# Patient Record
Sex: FEMALE | Race: OTHER | NOT HISPANIC OR LATINO
[De-identification: names, ages, dates, MRNs, and addresses within clinical notes are randomized per-mention and may not be internally consistent; named-entity substitution may affect disease eponyms.]

---

## 2017-08-02 ENCOUNTER — APPOINTMENT (OUTPATIENT)
Dept: ANTEPARTUM | Facility: CLINIC | Age: 32
End: 2017-08-02

## 2017-08-02 PROBLEM — Z00.00 ENCOUNTER FOR PREVENTIVE HEALTH EXAMINATION: Status: ACTIVE | Noted: 2017-08-02

## 2017-11-06 ENCOUNTER — ASOB RESULT (OUTPATIENT)
Age: 32
End: 2017-11-06

## 2017-11-06 ENCOUNTER — APPOINTMENT (OUTPATIENT)
Dept: ANTEPARTUM | Facility: CLINIC | Age: 32
End: 2017-11-06
Payer: MEDICAID

## 2017-11-06 PROCEDURE — 76805 OB US >/= 14 WKS SNGL FETUS: CPT

## 2017-11-16 ENCOUNTER — TRANSCRIPTION ENCOUNTER (OUTPATIENT)
Age: 32
End: 2017-11-16

## 2017-12-15 ENCOUNTER — INPATIENT (INPATIENT)
Facility: HOSPITAL | Age: 32
LOS: 2 days | Discharge: HOME | End: 2017-12-18
Attending: OBSTETRICS & GYNECOLOGY | Admitting: OBSTETRICS & GYNECOLOGY
Payer: MEDICAID

## 2017-12-15 VITALS — WEIGHT: 174.17 LBS | HEIGHT: 62.5 IN

## 2017-12-15 DIAGNOSIS — O26.893 OTHER SPECIFIED PREGNANCY RELATED CONDITIONS, THIRD TRIMESTER: ICD-10-CM

## 2017-12-15 DIAGNOSIS — O47.1 FALSE LABOR AT OR AFTER 37 COMPLETED WEEKS OF GESTATION: ICD-10-CM

## 2017-12-15 LAB
ABO RH CONFIRMATION: SIGNIFICANT CHANGE UP
APPEARANCE UR: CLEAR — SIGNIFICANT CHANGE UP
BACTERIA # UR AUTO: ABNORMAL
BASOPHILS # BLD AUTO: 0 K/UL — SIGNIFICANT CHANGE UP (ref 0–0.2)
BASOPHILS NFR BLD AUTO: 0.2 % — SIGNIFICANT CHANGE UP (ref 0–2)
BILIRUB UR-MCNC: NEGATIVE — SIGNIFICANT CHANGE UP
BLD GP AB SCN SERPL QL: SIGNIFICANT CHANGE UP
COLOR SPEC: YELLOW — SIGNIFICANT CHANGE UP
DIFF PNL FLD: ABNORMAL
EOSINOPHIL # BLD AUTO: 0.1 K/UL — SIGNIFICANT CHANGE UP (ref 0–0.5)
EOSINOPHIL NFR BLD AUTO: 1.2 % — SIGNIFICANT CHANGE UP (ref 0–6)
EPI CELLS # UR: SIGNIFICANT CHANGE UP
GLUCOSE UR QL: NEGATIVE MG/DL — SIGNIFICANT CHANGE UP
HCT VFR BLD CALC: 35.6 % — LOW (ref 37–47)
HGB BLD-MCNC: 11.5 G/DL — LOW (ref 12–16)
KETONES UR-MCNC: ABNORMAL
LEUKOCYTE ESTERASE UR-ACNC: ABNORMAL
LYMPHOCYTES # BLD AUTO: 1.8 K/UL — SIGNIFICANT CHANGE UP (ref 1–4.8)
LYMPHOCYTES # BLD AUTO: 19.3 % — LOW (ref 20–55)
MCHC RBC-ENTMCNC: 28 PG — SIGNIFICANT CHANGE UP (ref 27–31)
MCHC RBC-ENTMCNC: 32.3 G/DL — SIGNIFICANT CHANGE UP (ref 32–36)
MCV RBC AUTO: 86.6 FL — SIGNIFICANT CHANGE UP (ref 81–99)
MONOCYTES # BLD AUTO: 0.6 K/UL — SIGNIFICANT CHANGE UP (ref 0–0.8)
MONOCYTES NFR BLD AUTO: 6 % — SIGNIFICANT CHANGE UP (ref 3–10)
NEUTROPHILS # BLD AUTO: 6.7 K/UL — SIGNIFICANT CHANGE UP (ref 1.8–8)
NEUTROPHILS NFR BLD AUTO: 72.1 % — SIGNIFICANT CHANGE UP (ref 37–73)
NITRITE UR-MCNC: NEGATIVE — SIGNIFICANT CHANGE UP
PH UR: 6 — SIGNIFICANT CHANGE UP (ref 5–8)
PLATELET # BLD AUTO: 228 K/UL — SIGNIFICANT CHANGE UP (ref 150–400)
PROT UR-MCNC: 15 MG/DL
RBC # BLD: 4.11 M/UL — LOW (ref 4.4–5.2)
RBC # FLD: 18.5 % — HIGH (ref 11–15.6)
RBC CASTS # UR COMP ASSIST: SIGNIFICANT CHANGE UP /HPF (ref 0–4)
SP GR SPEC: 1.02 — SIGNIFICANT CHANGE UP (ref 1.01–1.02)
TYPE + AB SCN PNL BLD: SIGNIFICANT CHANGE UP
UROBILINOGEN FLD QL: NEGATIVE MG/DL — SIGNIFICANT CHANGE UP
WBC # BLD: 9.3 K/UL — SIGNIFICANT CHANGE UP (ref 4.8–10.8)
WBC # FLD AUTO: 9.3 K/UL — SIGNIFICANT CHANGE UP (ref 4.8–10.8)
WBC UR QL: SIGNIFICANT CHANGE UP

## 2017-12-15 RX ORDER — PENICILLIN G POTASSIUM 5000000 [IU]/1
2.5 POWDER, FOR SOLUTION INTRAMUSCULAR; INTRAPLEURAL; INTRATHECAL; INTRAVENOUS EVERY 4 HOURS
Qty: 0 | Refills: 0 | Status: DISCONTINUED | OUTPATIENT
Start: 2017-12-15 | End: 2017-12-18

## 2017-12-15 RX ORDER — CITRIC ACID/SODIUM CITRATE 300-500 MG
30 SOLUTION, ORAL ORAL ONCE
Qty: 0 | Refills: 0 | Status: DISCONTINUED | OUTPATIENT
Start: 2017-12-15 | End: 2017-12-15

## 2017-12-15 RX ORDER — PENICILLIN G POTASSIUM 5000000 [IU]/1
5 POWDER, FOR SOLUTION INTRAMUSCULAR; INTRAPLEURAL; INTRATHECAL; INTRAVENOUS ONCE
Qty: 0 | Refills: 0 | Status: DISCONTINUED | OUTPATIENT
Start: 2017-12-15 | End: 2017-12-15

## 2017-12-15 RX ORDER — PENICILLIN G POTASSIUM 5000000 [IU]/1
5 POWDER, FOR SOLUTION INTRAMUSCULAR; INTRAPLEURAL; INTRATHECAL; INTRAVENOUS ONCE
Qty: 0 | Refills: 0 | Status: COMPLETED | OUTPATIENT
Start: 2017-12-15 | End: 2017-12-15

## 2017-12-15 RX ORDER — PENICILLIN G POTASSIUM 5000000 [IU]/1
POWDER, FOR SOLUTION INTRAMUSCULAR; INTRAPLEURAL; INTRATHECAL; INTRAVENOUS
Qty: 0 | Refills: 0 | Status: DISCONTINUED | OUTPATIENT
Start: 2017-12-15 | End: 2017-12-18

## 2017-12-15 RX ORDER — OXYTOCIN 10 UNIT/ML
41.67 VIAL (ML) INJECTION
Qty: 20 | Refills: 0 | Status: DISCONTINUED | OUTPATIENT
Start: 2017-12-15 | End: 2017-12-16

## 2017-12-15 RX ORDER — SODIUM CHLORIDE 9 MG/ML
1000 INJECTION, SOLUTION INTRAVENOUS ONCE
Qty: 0 | Refills: 0 | Status: COMPLETED | OUTPATIENT
Start: 2017-12-15 | End: 2017-12-16

## 2017-12-15 RX ORDER — CITRIC ACID/SODIUM CITRATE 300-500 MG
30 SOLUTION, ORAL ORAL ONCE
Qty: 0 | Refills: 0 | Status: COMPLETED | OUTPATIENT
Start: 2017-12-15 | End: 2017-12-16

## 2017-12-15 RX ORDER — SODIUM CHLORIDE 9 MG/ML
1000 INJECTION, SOLUTION INTRAVENOUS
Qty: 0 | Refills: 0 | Status: DISCONTINUED | OUTPATIENT
Start: 2017-12-15 | End: 2017-12-16

## 2017-12-15 RX ORDER — PENICILLIN G POTASSIUM 5000000 [IU]/1
2.5 POWDER, FOR SOLUTION INTRAMUSCULAR; INTRAPLEURAL; INTRATHECAL; INTRAVENOUS EVERY 4 HOURS
Qty: 0 | Refills: 0 | Status: DISCONTINUED | OUTPATIENT
Start: 2017-12-15 | End: 2017-12-15

## 2017-12-15 RX ORDER — PENICILLIN G POTASSIUM 5000000 [IU]/1
POWDER, FOR SOLUTION INTRAMUSCULAR; INTRAPLEURAL; INTRATHECAL; INTRAVENOUS
Qty: 0 | Refills: 0 | Status: DISCONTINUED | OUTPATIENT
Start: 2017-12-15 | End: 2017-12-15

## 2017-12-15 RX ADMIN — PENICILLIN G POTASSIUM 200 MILLION UNIT(S): 5000000 POWDER, FOR SOLUTION INTRAMUSCULAR; INTRAPLEURAL; INTRATHECAL; INTRAVENOUS at 20:22

## 2017-12-15 RX ADMIN — PENICILLIN G POTASSIUM 200 MILLION UNIT(S): 5000000 POWDER, FOR SOLUTION INTRAMUSCULAR; INTRAPLEURAL; INTRATHECAL; INTRAVENOUS at 12:36

## 2017-12-15 RX ADMIN — SODIUM CHLORIDE 125 MILLILITER(S): 9 INJECTION, SOLUTION INTRAVENOUS at 11:30

## 2017-12-15 RX ADMIN — PENICILLIN G POTASSIUM 200 MILLION UNIT(S): 5000000 POWDER, FOR SOLUTION INTRAMUSCULAR; INTRAPLEURAL; INTRATHECAL; INTRAVENOUS at 16:39

## 2017-12-16 ENCOUNTER — RESULT REVIEW (OUTPATIENT)
Age: 32
End: 2017-12-16

## 2017-12-16 ENCOUNTER — TRANSCRIPTION ENCOUNTER (OUTPATIENT)
Age: 32
End: 2017-12-16

## 2017-12-16 LAB
BASE EXCESS BLDCOA CALC-SCNC: -5.7 MMOL/L — LOW (ref -2–2)
BASE EXCESS BLDCOV CALC-SCNC: -6.8 MMOL/L — LOW (ref -2–2)
GAS PNL BLDCOV: 7.24 — LOW (ref 7.25–7.45)
HCO3 BLDCOA-SCNC: 18 MMOL/L — LOW (ref 21–29)
HCO3 BLDCOV-SCNC: 18 MMOL/L — LOW (ref 21–29)
PCO2 BLDCOA: 58.8 MMHG — SIGNIFICANT CHANGE UP (ref 32–68)
PCO2 BLDCOV: 49.6 MMHG — SIGNIFICANT CHANGE UP (ref 29–53)
PH BLDCOA: 7.21 — SIGNIFICANT CHANGE UP (ref 7.18–7.38)
PO2 BLDCOA: 11.6 MMHG — SIGNIFICANT CHANGE UP (ref 5.7–30.5)
PO2 BLDCOA: 26 MMHG — SIGNIFICANT CHANGE UP (ref 17–41)
SAO2 % BLDCOA: SIGNIFICANT CHANGE UP
SAO2 % BLDCOV: SIGNIFICANT CHANGE UP

## 2017-12-16 PROCEDURE — 88307 TISSUE EXAM BY PATHOLOGIST: CPT | Mod: 26

## 2017-12-16 RX ORDER — ACETAMINOPHEN 500 MG
650 TABLET ORAL EVERY 6 HOURS
Qty: 0 | Refills: 0 | Status: DISCONTINUED | OUTPATIENT
Start: 2017-12-16 | End: 2017-12-18

## 2017-12-16 RX ORDER — LANOLIN
1 OINTMENT (GRAM) TOPICAL EVERY 6 HOURS
Qty: 0 | Refills: 0 | Status: DISCONTINUED | OUTPATIENT
Start: 2017-12-16 | End: 2017-12-18

## 2017-12-16 RX ORDER — GLYCERIN ADULT
1 SUPPOSITORY, RECTAL RECTAL AT BEDTIME
Qty: 0 | Refills: 0 | Status: DISCONTINUED | OUTPATIENT
Start: 2017-12-16 | End: 2017-12-18

## 2017-12-16 RX ORDER — DIPHENHYDRAMINE HCL 50 MG
25 CAPSULE ORAL EVERY 6 HOURS
Qty: 0 | Refills: 0 | Status: DISCONTINUED | OUTPATIENT
Start: 2017-12-16 | End: 2017-12-18

## 2017-12-16 RX ORDER — SIMETHICONE 80 MG/1
80 TABLET, CHEWABLE ORAL EVERY 6 HOURS
Qty: 0 | Refills: 0 | Status: DISCONTINUED | OUTPATIENT
Start: 2017-12-16 | End: 2017-12-18

## 2017-12-16 RX ORDER — AER TRAVELER 0.5 G/1
1 SOLUTION RECTAL; TOPICAL EVERY 4 HOURS
Qty: 0 | Refills: 0 | Status: DISCONTINUED | OUTPATIENT
Start: 2017-12-16 | End: 2017-12-18

## 2017-12-16 RX ORDER — SODIUM CHLORIDE 9 MG/ML
3 INJECTION INTRAMUSCULAR; INTRAVENOUS; SUBCUTANEOUS EVERY 8 HOURS
Qty: 0 | Refills: 0 | Status: DISCONTINUED | OUTPATIENT
Start: 2017-12-16 | End: 2017-12-18

## 2017-12-16 RX ORDER — DIBUCAINE 1 %
1 OINTMENT (GRAM) RECTAL EVERY 4 HOURS
Qty: 0 | Refills: 0 | Status: DISCONTINUED | OUTPATIENT
Start: 2017-12-16 | End: 2017-12-18

## 2017-12-16 RX ORDER — IBUPROFEN 200 MG
600 TABLET ORAL EVERY 6 HOURS
Qty: 0 | Refills: 0 | Status: DISCONTINUED | OUTPATIENT
Start: 2017-12-16 | End: 2017-12-18

## 2017-12-16 RX ORDER — PRAMOXINE HYDROCHLORIDE 150 MG/15G
1 AEROSOL, FOAM RECTAL EVERY 4 HOURS
Qty: 0 | Refills: 0 | Status: DISCONTINUED | OUTPATIENT
Start: 2017-12-16 | End: 2017-12-18

## 2017-12-16 RX ORDER — TETANUS TOXOID, REDUCED DIPHTHERIA TOXOID AND ACELLULAR PERTUSSIS VACCINE, ADSORBED 5; 2.5; 8; 8; 2.5 [IU]/.5ML; [IU]/.5ML; UG/.5ML; UG/.5ML; UG/.5ML
0.5 SUSPENSION INTRAMUSCULAR ONCE
Qty: 0 | Refills: 0 | Status: DISCONTINUED | OUTPATIENT
Start: 2017-12-16 | End: 2017-12-18

## 2017-12-16 RX ORDER — DOCUSATE SODIUM 100 MG
100 CAPSULE ORAL
Qty: 0 | Refills: 0 | Status: DISCONTINUED | OUTPATIENT
Start: 2017-12-16 | End: 2017-12-18

## 2017-12-16 RX ORDER — SODIUM CHLORIDE 9 MG/ML
500 INJECTION, SOLUTION INTRAVENOUS
Qty: 0 | Refills: 0 | Status: COMPLETED | OUTPATIENT
Start: 2017-12-16 | End: 2017-12-16

## 2017-12-16 RX ORDER — OXYCODONE AND ACETAMINOPHEN 5; 325 MG/1; MG/1
2 TABLET ORAL EVERY 6 HOURS
Qty: 0 | Refills: 0 | Status: DISCONTINUED | OUTPATIENT
Start: 2017-12-16 | End: 2017-12-18

## 2017-12-16 RX ORDER — MAGNESIUM HYDROXIDE 400 MG/1
30 TABLET, CHEWABLE ORAL
Qty: 0 | Refills: 0 | Status: DISCONTINUED | OUTPATIENT
Start: 2017-12-16 | End: 2017-12-18

## 2017-12-16 RX ORDER — HYDROCORTISONE 1 %
1 OINTMENT (GRAM) TOPICAL EVERY 4 HOURS
Qty: 0 | Refills: 0 | Status: DISCONTINUED | OUTPATIENT
Start: 2017-12-16 | End: 2017-12-18

## 2017-12-16 RX ORDER — OXYTOCIN 10 UNIT/ML
41.67 VIAL (ML) INJECTION
Qty: 20 | Refills: 0 | Status: DISCONTINUED | OUTPATIENT
Start: 2017-12-16 | End: 2017-12-18

## 2017-12-16 RX ADMIN — PENICILLIN G POTASSIUM 200 MILLION UNIT(S): 5000000 POWDER, FOR SOLUTION INTRAMUSCULAR; INTRAPLEURAL; INTRATHECAL; INTRAVENOUS at 04:06

## 2017-12-16 RX ADMIN — Medication 600 MILLIGRAM(S): at 21:30

## 2017-12-16 RX ADMIN — OXYCODONE AND ACETAMINOPHEN 2 TABLET(S): 5; 325 TABLET ORAL at 19:10

## 2017-12-16 RX ADMIN — Medication 600 MILLIGRAM(S): at 13:56

## 2017-12-16 RX ADMIN — OXYCODONE AND ACETAMINOPHEN 2 TABLET(S): 5; 325 TABLET ORAL at 13:00

## 2017-12-16 RX ADMIN — SODIUM CHLORIDE 3 MILLILITER(S): 9 INJECTION INTRAMUSCULAR; INTRAVENOUS; SUBCUTANEOUS at 16:39

## 2017-12-16 RX ADMIN — Medication 30 MILLILITER(S): at 03:06

## 2017-12-16 RX ADMIN — Medication 1 TABLET(S): at 13:56

## 2017-12-16 RX ADMIN — OXYCODONE AND ACETAMINOPHEN 2 TABLET(S): 5; 325 TABLET ORAL at 17:27

## 2017-12-16 RX ADMIN — SODIUM CHLORIDE 999 MILLILITER(S): 9 INJECTION, SOLUTION INTRAVENOUS at 04:54

## 2017-12-16 RX ADMIN — Medication 600 MILLIGRAM(S): at 14:50

## 2017-12-16 RX ADMIN — Medication 600 MILLIGRAM(S): at 20:39

## 2017-12-16 RX ADMIN — SODIUM CHLORIDE 2000 MILLILITER(S): 9 INJECTION, SOLUTION INTRAVENOUS at 02:35

## 2017-12-16 RX ADMIN — PENICILLIN G POTASSIUM 200 MILLION UNIT(S): 5000000 POWDER, FOR SOLUTION INTRAMUSCULAR; INTRAPLEURAL; INTRATHECAL; INTRAVENOUS at 08:35

## 2017-12-16 RX ADMIN — OXYCODONE AND ACETAMINOPHEN 2 TABLET(S): 5; 325 TABLET ORAL at 11:34

## 2017-12-16 RX ADMIN — PENICILLIN G POTASSIUM 200 MILLION UNIT(S): 5000000 POWDER, FOR SOLUTION INTRAMUSCULAR; INTRAPLEURAL; INTRATHECAL; INTRAVENOUS at 00:18

## 2017-12-16 NOTE — DISCHARGE NOTE OB - MATERIALS PROVIDED
Breastfeeding Log/MMR Vaccination (VIS Pub Date: 2012)/Vaccinations/Breastfeeding Mother’s Support Group Information/Breastfeeding Guide and Packet/Back To Sleep Handout/Shaken Baby Prevention Handout/Birth Certificate Instructions/Tdap Vaccination (VIS Pub Date: 2012)/[facility]  Screening Program/  Immunization Record/Guide to Postpartum Care/[facility] Hearing Screen Program/Discharge Medication Information for Patients and Families Pocket Guide/Letter of Medical Neccessity

## 2017-12-16 NOTE — DISCHARGE NOTE OB - HOSPITAL COURSE
32 years old female  who underwent vacuum-assisted delivery due to tracing category II and maternal exhaustion after induction of labor for premature rupture of membranes.   She delivered a live male infant weighting  4380g and with Apgar scores 3/8, posterior shoulder failed to deliver initially due to poor maternal efforts but it was delivered with rotational maneuver. No episiotomy needed, no lacerations present but she received prophylactically 1000 mcg of misoprostol rectally due to mild uterine atony. EBL was 300mL.   On her postpartum, patient complained of severe pubic, vaginal and lower back that interfered with ambulation, for which CT scan of abdomen and pelvis was ordered that failed to show any abnormalities other than normal postpartum findings. Patient was evaluated by PT and she is able to ambulate with rolling walker, she is voiding, tolerating PO intake and having bowel movements. Her Hb/Hct are 9.0g/dL and 28.5% respectively, she is stable and ready for discharge home. F/U at [facility] in 6 weeks for postpartum check and with PT as outpatient if necessary.

## 2017-12-16 NOTE — DISCHARGE NOTE OB - OTHER SIGNIFICANT FINDINGS
< from: CT Abdomen and Pelvis No Cont (12.17.17 @ 17:07) >  FINDINGS:    LOWER CHEST: Visualized lung bases, heart and pericardium are   unremarkable.    LIVER: Within normal limits.  SPLEEN: Within normal limits.  PANCREAS: Within normal limits.  GALLBLADDER: Within normal limits.  BILE DUCTS: Normal caliber.  ADRENALS: Within normal limits.  KIDNEYS/URETERS: No mass, stone or hydronephrosis.    RETROPERITONEUM: No lymphadenopathy.    VESSELS:  Within normal limits.    BOWEL: No bowel obstruction, wall thickening or inflammatory change.   Appendix normal.  PERITONEUM: No ascites , hemorrhage or pneumoperitoneum. There is no   intra-abdominal fluid collection.    REPRODUCTIVE ORGANS: Enlarged uterus compatible with postpartum state. A   small amount of clot is noted in the endometrial and endocervical   cavity's.  BLADDER: Within normal limits.    ABDOMINAL WALL: Within normal limits.  BONES: No acute bony abnormality.    IMPRESSION:   Expected changes in the uterus status post recent vaginal delivery with   small amount of clot in the endometrial and endocervical cavities. No   other acute findings      < end of copied text >

## 2017-12-16 NOTE — DISCHARGE NOTE OB - PLAN OF CARE
Follow the recommendations by physical therapy, walk with supervision and use the rolling walker for support and outpatient follow up if necessary.  Continue taking Colace 100 mg 2 times per day as needed to soften stools and eat plenty of fruits and vegetables and drink a lot of fluids to prevent straining during defecation.  Continue applying Dibucaine ointment and Pramoxine pain cream every 4 hours for perineal pain.   Continue taking Percocet 2 tablets every 4 hours, only as needed for severe pain and Ibuprofen 1 tablet every 6 hours, only as needed for moderate pain. Reached Please f/u at [facility] in 6 weeks for postpartum check, take prenatal vitamins and iron as prescribed. Continue Breastfeeding and mother/baby bonding.

## 2017-12-16 NOTE — DISCHARGE NOTE OB - CARE PLAN
Principal Discharge DX:	Forceps or vacuum extractor delivery  Goal:	Reached  Instructions for follow-up, activity and diet:	Please f/u at [facility] in 6 weeks for postpartum check, take prenatal vitamins and iron as prescribed. Continue Breastfeeding and mother/baby bonding.  Secondary Diagnosis:	Perineal pain in female  Instructions for follow-up, activity and diet:	Follow the recommendations by physical therapy, walk with supervision and use the rolling walker for support and outpatient follow up if necessary.  Continue taking Colace 100 mg 2 times per day as needed to soften stools and eat plenty of fruits and vegetables and drink a lot of fluids to prevent straining during defecation.  Continue applying Dibucaine ointment and Pramoxine pain cream every 4 hours for perineal pain.   Continue taking Percocet 2 tablets every 4 hours, only as needed for severe pain and Ibuprofen 1 tablet every 6 hours, only as needed for moderate pain. Principal Discharge DX:	Forceps or vacuum extractor delivery  Goal:	Reached  Instructions for follow-up, activity and diet:	Please f/u at [facility] in 6 weeks for postpartum check, take prenatal vitamins and iron as prescribed. Continue Breastfeeding and mother/baby bonding.  Secondary Diagnosis:	Postpartum pain  Instructions for follow-up, activity and diet:	Follow the recommendations by physical therapy, walk with supervision and use the rolling walker for support and outpatient follow up if necessary.  Continue taking Colace 100 mg 2 times per day as needed to soften stools and eat plenty of fruits and vegetables and drink a lot of fluids to prevent straining during defecation.  Continue applying Dibucaine ointment and Pramoxine pain cream every 4 hours for perineal pain.   Continue taking Percocet 2 tablets every 4 hours, only as needed for severe pain and Ibuprofen 1 tablet every 6 hours, only as needed for moderate pain.

## 2017-12-16 NOTE — DISCHARGE NOTE OB - PATIENT PORTAL LINK FT
“You can access the Follow[facility] Patient Portal, offered by [facility], by registering with the following website: [email or web address]”

## 2017-12-16 NOTE — DISCHARGE NOTE OB - CARE PROVIDER_API CALL
Cher Grove (MD), Sascha [facility] Obstetrics and Gynecology  [facility] [place] [address]  Phone: [phone]  Fax: [phone]

## 2017-12-16 NOTE — DISCHARGE NOTE OB - MEDICATION SUMMARY - MEDICATIONS TO TAKE
I will START or STAY ON the medications listed below when I get home from the hospital:    ibuprofen 600 mg oral tablet  -- 1 tab(s) by mouth every 6 hours, As needed, Moderate Pain  -- Indication: For Moderate pain    oxyCODONE-acetaminophen 5 mg-325 mg oral tablet  -- 2 tab(s) by mouth every 6 hours, As needed, Severe Pain MDD:8 tablets  -- Indication: For Severe Pain    pramoxine-zinc oxide 1%-5% topical cream  -- Apply on skin to affected area every 4 hours, As Needed for Moderate to severe perineal pain  -- For external use only.    -- Indication: For Perineal pain    dibucaine 1% topical ointment  -- 1 application on skin every 4 hours, As needed, Perineal Discomfort  -- Indication: For Perineal pain    Prenatal Multivitamins with Folic Acid 1 mg oral tablet  -- 1 tab(s) by mouth once a day  -- Indication: For Supplement    ferrous sulfate 325 mg (65 mg elemental iron) oral tablet  -- 1 tab(s) by mouth once a day   -- Check with your doctor before becoming pregnant.  Do not chew, break, or crush.  May discolor urine or feces.    -- Indication: For Supplement    Colace 100 mg oral capsule  -- 1 cap(s) by mouth 2 times a day, As needed, Stool Softening  -- Indication: For Constipation I will START or STAY ON the medications listed below when I get home from the hospital:    DME rolling walker  -- DME Rolling walker    ICD 10: Z37.0   wtih vacuum assisted  -- Indication: For severe pain    oxyCODONE-acetaminophen 5 mg-325 mg oral tablet  -- 2 tab(s) by mouth every 6 hours, As needed, Severe Pain MDD:8 tablets  -- Indication: For Severe Pain    ibuprofen 600 mg oral tablet  -- 1 tab(s) by mouth every 6 hours, As needed, Moderate Pain  -- Indication: For Moderate pain    pramoxine-zinc oxide 1%-5% topical cream  -- Apply on skin to affected area every 4 hours, As Needed for Moderate to severe perineal pain  -- For external use only.    -- Indication: For Perineal pain    dibucaine 1% topical ointment  -- 1 application on skin every 4 hours, As needed, Perineal Discomfort  -- Indication: For Perineal pain    Prenatal Multivitamins with Folic Acid 1 mg oral tablet  -- 1 tab(s) by mouth once a day  -- Indication: For Supplement    ferrous sulfate 325 mg (65 mg elemental iron) oral tablet  -- 1 tab(s) by mouth once a day   -- Check with your doctor before becoming pregnant.  Do not chew, break, or crush.  May discolor urine or feces.    -- Indication: For Supplement    Colace 100 mg oral capsule  -- 1 cap(s) by mouth 2 times a day, As needed, Stool Softening  -- Indication: For Constipation

## 2017-12-17 DIAGNOSIS — R10.9 UNSPECIFIED ABDOMINAL PAIN: ICD-10-CM

## 2017-12-17 LAB
BASOPHILS # BLD AUTO: 0 K/UL — SIGNIFICANT CHANGE UP (ref 0–0.2)
BASOPHILS NFR BLD AUTO: 0.1 % — SIGNIFICANT CHANGE UP (ref 0–2)
EOSINOPHIL # BLD AUTO: 0.1 K/UL — SIGNIFICANT CHANGE UP (ref 0–0.5)
EOSINOPHIL NFR BLD AUTO: 0.9 % — SIGNIFICANT CHANGE UP (ref 0–6)
HCT VFR BLD CALC: 28.5 % — LOW (ref 37–47)
HGB BLD-MCNC: 9 G/DL — LOW (ref 12–16)
LYMPHOCYTES # BLD AUTO: 18.1 % — LOW (ref 20–55)
LYMPHOCYTES # BLD AUTO: 2.5 K/UL — SIGNIFICANT CHANGE UP (ref 1–4.8)
MCHC RBC-ENTMCNC: 27.8 PG — SIGNIFICANT CHANGE UP (ref 27–31)
MCHC RBC-ENTMCNC: 31.6 G/DL — LOW (ref 32–36)
MCV RBC AUTO: 88 FL — SIGNIFICANT CHANGE UP (ref 81–99)
MONOCYTES # BLD AUTO: 0.7 K/UL — SIGNIFICANT CHANGE UP (ref 0–0.8)
MONOCYTES NFR BLD AUTO: 5.3 % — SIGNIFICANT CHANGE UP (ref 3–10)
NEUTROPHILS # BLD AUTO: 10.4 K/UL — HIGH (ref 1.8–8)
NEUTROPHILS NFR BLD AUTO: 75 % — HIGH (ref 37–73)
PLATELET # BLD AUTO: 192 K/UL — SIGNIFICANT CHANGE UP (ref 150–400)
RBC # BLD: 3.24 M/UL — LOW (ref 4.4–5.2)
RBC # FLD: 18.9 % — HIGH (ref 11–15.6)
T PALLIDUM AB TITR SER: NEGATIVE — SIGNIFICANT CHANGE UP
WBC # BLD: 13.9 K/UL — HIGH (ref 4.8–10.8)
WBC # FLD AUTO: 13.9 K/UL — HIGH (ref 4.8–10.8)

## 2017-12-17 PROCEDURE — 74176 CT ABD & PELVIS W/O CONTRAST: CPT | Mod: 26

## 2017-12-17 RX ADMIN — OXYCODONE AND ACETAMINOPHEN 2 TABLET(S): 5; 325 TABLET ORAL at 20:30

## 2017-12-17 RX ADMIN — SODIUM CHLORIDE 3 MILLILITER(S): 9 INJECTION INTRAMUSCULAR; INTRAVENOUS; SUBCUTANEOUS at 17:55

## 2017-12-17 RX ADMIN — Medication 600 MILLIGRAM(S): at 09:25

## 2017-12-17 RX ADMIN — OXYCODONE AND ACETAMINOPHEN 2 TABLET(S): 5; 325 TABLET ORAL at 13:45

## 2017-12-17 RX ADMIN — Medication 600 MILLIGRAM(S): at 22:45

## 2017-12-17 RX ADMIN — OXYCODONE AND ACETAMINOPHEN 2 TABLET(S): 5; 325 TABLET ORAL at 19:53

## 2017-12-17 RX ADMIN — OXYCODONE AND ACETAMINOPHEN 2 TABLET(S): 5; 325 TABLET ORAL at 12:49

## 2017-12-17 RX ADMIN — Medication 600 MILLIGRAM(S): at 23:45

## 2017-12-17 RX ADMIN — Medication 600 MILLIGRAM(S): at 08:26

## 2017-12-17 RX ADMIN — Medication 1 TABLET(S): at 12:48

## 2017-12-17 RX ADMIN — OXYCODONE AND ACETAMINOPHEN 2 TABLET(S): 5; 325 TABLET ORAL at 06:22

## 2017-12-17 RX ADMIN — OXYCODONE AND ACETAMINOPHEN 2 TABLET(S): 5; 325 TABLET ORAL at 07:20

## 2017-12-17 NOTE — PROGRESS NOTE ADULT - ASSESSMENT
32 year old  s/p  with vacuum assist at [id number] gestation PPD#1. Patient is currently progressing towards discharge on PPD2. 32 year old  s/p  with vacuum assist at [id number] gestation PPD#1. Patient is currently progressing towards discharge on PPD2. Will order CT Abd/Pelv to rule out any vaginal or retroperitoneal hematoma. If negative, will continue pain control. PT eval as well due to difficulty for patient to ambulate.

## 2017-12-17 NOTE — PROGRESS NOTE ADULT - SUBJECTIVE AND OBJECTIVE BOX
INTERVAL HPI/OVERNIGHT EVENTS:  32y Female s/p labor epidural on 12/16/17    Vital Signs Last 24 Hrs  T(C): 36.6 (17 Dec 2017 05:14), Max: 37.2 (16 Dec 2017 15:47)  T(F): 97.8 (17 Dec 2017 05:14), Max: 98.9 (16 Dec 2017 15:47)  HR: 91 (17 Dec 2017 05:14) (76 - 106)  BP: 105/72 (17 Dec 2017 05:14) (96/66 - 120/74)  BP(mean): --  RR: 20 (17 Dec 2017 05:14) (15 - 20)  SpO2: --    Patient seen, doing well, no anesthetic complications or complaints noted or reported.

## 2017-12-17 NOTE — PROGRESS NOTE ADULT - SUBJECTIVE AND OBJECTIVE BOX
32 year old  s/p  with vacuum assist at [id number] gestation PPD#1.   Patient seen and examined at bedside, no acute overnight events.   Patient is ambulating, +eating, +PO hydration, +voiding, +Flatus, -BM, +Breast feeding and pain is causing some mild discomfort in her lower abdomen and pelvic area.   Denies headache, SOB, fever, chills and calf pain.    VS:   Vital Signs Last 24 Hrs  T(C): 36.6 (17 Dec 2017 05:14), Max: 37.2 (16 Dec 2017 15:47)  T(F): 97.8 (17 Dec 2017 05:14), Max: 98.9 (16 Dec 2017 15:47)  HR: 91 (17 Dec 2017 05:14) (76 - 106)  BP: 105/72 (17 Dec 2017 05:14) (96/66 - 120/74)  BP(mean): --  RR: 20 (17 Dec 2017 05:14) (15 - 20)  SpO2: --    Physical Exam:  General: NAD  CVS: RRR, +S1/S2  Lungs: CTAB, no wheeze, rhonchi or rales.   Abdomen: +BS, soft, ND, minimally tender, Fundus firm at level of umbilicus.   Pelvic: Minimal lochia  Ext: No cyanosis, edema or calf tenderness. 2+ peripheral pulses    Labs:                        11.5   9.3   )-----------( 228      ( 15 Dec 2017 14:14 )             35.6     Urinalysis Basic - ( 15 Dec 2017 14:16 )    Color: Yellow / Appearance: Clear / S.020 / pH: x  Gluc: x / Ketone: Trace  / Bili: Negative / Urobili: Negative mg/dL   Blood: x / Protein: 15 mg/dL / Nitrite: Negative   Leuk Esterase: Trace / RBC: 0-2 /HPF / WBC 3-5   Sq Epi: x / Non Sq Epi: Occasional / Bacteria: Occasional        Medication:  MEDICATIONS  (STANDING):  diphtheria/tetanus/pertussis (acellular) Vaccine (ADAcel) 0.5 milliLiter(s) IntraMuscular once  oxytocin Infusion 41.667 milliUNIT(s)/Min (125 mL/Hr) IV Continuous <Continuous>  penicillin   G  potassium  IVPB 2.5 Million Unit(s) IV Intermittent every 4 hours  penicillin   G  potassium  IVPB      prenatal multivitamin 1 Tablet(s) Oral daily  sodium chloride 0.9% lock flush 3 milliLiter(s) IV Push every 8 hours    MEDICATIONS  (PRN):  acetaminophen   Tablet 650 milliGRAM(s) Oral every 6 hours PRN For Temp greater than 38.5 C (101.3 F)  acetaminophen   Tablet. 650 milliGRAM(s) Oral every 6 hours PRN Mild Pain  dibucaine 1% Ointment 1 Application(s) Topical every 4 hours PRN Perineal Discomfort  diphenhydrAMINE   Capsule 25 milliGRAM(s) Oral every 6 hours PRN Itching  docusate sodium 100 milliGRAM(s) Oral two times a day PRN Stool Softening  glycerin Suppository - Adult 1 Suppository(s) Rectal at bedtime PRN Constipation  hydrocortisone 1% Cream 1 Application(s) Topical every 4 hours PRN Moderate to Severe Perineal Pain  ibuprofen  Tablet 600 milliGRAM(s) Oral every 6 hours PRN Moderate Pain  lanolin Ointment 1 Application(s) Topical every 6 hours PRN Sore Nipples  magnesium hydroxide Suspension 30 milliLiter(s) Oral two times a day PRN Constipation  oxyCODONE    5 mG/acetaminophen 325 mG 2 Tablet(s) Oral every 6 hours PRN Severe Pain  pramoxine 1%/zinc 5% Cream 1 Application(s) Topical every 4 hours PRN Moderate to Severe Perineal Pain  simethicone 80 milliGRAM(s) Chew every 6 hours PRN Gas  witch hazel Pads 1 Application(s) Topical every 4 hours PRN Perineal Discomfort 32 year old  s/p  with vacuum assist at [id number] gestation PPD#1.   Patient seen and examined at bedside, no acute overnight events.   Patient is ambulating, +eating, +PO hydration, +voiding, +Flatus, -BM, +Breast feeding and pain is causing some mild discomfort in her lower abdomen and pelvic area.   Denies headache, SOB, fever, chills and calf pain.    VS:   Vital Signs Last 24 Hrs  T(C): 36.6 (17 Dec 2017 05:14), Max: 37.2 (16 Dec 2017 15:47)  T(F): 97.8 (17 Dec 2017 05:14), Max: 98.9 (16 Dec 2017 15:47)  HR: 91 (17 Dec 2017 05:14) (76 - 106)  BP: 105/72 (17 Dec 2017 05:14) (96/66 - 120/74)  BP(mean): --  RR: 20 (17 Dec 2017 05:14) (15 - 20)  SpO2: --    Physical Exam:  General: NAD  CVS: RRR, +S1/S2  Lungs: CTAB, no wheeze, rhonchi or rales.   Abdomen: +BS, soft, ND,  Fundus firm at level of umbilicus. Tenderness in LLQ, RLQ and suprapublically.   Pelvic: Minimal lochia  Ext: No cyanosis, edema or calf tenderness. 2+ peripheral pulses    Labs:                        11.5   9.3   )-----------( 228      ( 15 Dec 2017 14:14 )             35.6     Urinalysis Basic - ( 15 Dec 2017 14:16 )    Color: Yellow / Appearance: Clear / S.020 / pH: x  Gluc: x / Ketone: Trace  / Bili: Negative / Urobili: Negative mg/dL   Blood: x / Protein: 15 mg/dL / Nitrite: Negative   Leuk Esterase: Trace / RBC: 0-2 /HPF / WBC 3-5   Sq Epi: x / Non Sq Epi: Occasional / Bacteria: Occasional        Medication:  MEDICATIONS  (STANDING):  diphtheria/tetanus/pertussis (acellular) Vaccine (ADAcel) 0.5 milliLiter(s) IntraMuscular once  oxytocin Infusion 41.667 milliUNIT(s)/Min (125 mL/Hr) IV Continuous <Continuous>  penicillin   G  potassium  IVPB 2.5 Million Unit(s) IV Intermittent every 4 hours  penicillin   G  potassium  IVPB      prenatal multivitamin 1 Tablet(s) Oral daily  sodium chloride 0.9% lock flush 3 milliLiter(s) IV Push every 8 hours    MEDICATIONS  (PRN):  acetaminophen   Tablet 650 milliGRAM(s) Oral every 6 hours PRN For Temp greater than 38.5 C (101.3 F)  acetaminophen   Tablet. 650 milliGRAM(s) Oral every 6 hours PRN Mild Pain  dibucaine 1% Ointment 1 Application(s) Topical every 4 hours PRN Perineal Discomfort  diphenhydrAMINE   Capsule 25 milliGRAM(s) Oral every 6 hours PRN Itching  docusate sodium 100 milliGRAM(s) Oral two times a day PRN Stool Softening  glycerin Suppository - Adult 1 Suppository(s) Rectal at bedtime PRN Constipation  hydrocortisone 1% Cream 1 Application(s) Topical every 4 hours PRN Moderate to Severe Perineal Pain  ibuprofen  Tablet 600 milliGRAM(s) Oral every 6 hours PRN Moderate Pain  lanolin Ointment 1 Application(s) Topical every 6 hours PRN Sore Nipples  magnesium hydroxide Suspension 30 milliLiter(s) Oral two times a day PRN Constipation  oxyCODONE    5 mG/acetaminophen 325 mG 2 Tablet(s) Oral every 6 hours PRN Severe Pain  pramoxine 1%/zinc 5% Cream 1 Application(s) Topical every 4 hours PRN Moderate to Severe Perineal Pain  simethicone 80 milliGRAM(s) Chew every 6 hours PRN Gas  witch hazel Pads 1 Application(s) Topical every 4 hours PRN Perineal Discomfort

## 2017-12-17 NOTE — PROGRESS NOTE ADULT - PROBLEM SELECTOR PLAN 1
Encourage ambulation & hydration  Encourage mother/baby interaction  Plan for discharge PPD2  Pain control PRN

## 2017-12-17 NOTE — PROGRESS NOTE ADULT - ASSESSMENT
A/P 32y on PPD#1 s/p vacuum assisted VD, stable, Patient reports severe pubic, vaginal and lower back pain that has not improved over night.     Ambulating with difficulty due to pain, voiding, tolerating PO, lochia decreased  Vital Signs Last 24 Hrs  T(C): 36.6 (17 Dec 2017 05:14), Max: 37.2 (16 Dec 2017 15:47)  T(F): 97.8 (17 Dec 2017 05:14), Max: 98.9 (16 Dec 2017 15:47)  HR: 91 (17 Dec 2017 05:14) (76 - 106)  BP: 105/72 (17 Dec 2017 05:14) (96/66 - 120/74)  BP(mean): --  RR: 20 (17 Dec 2017 05:14) (15 - 20)  SpO2: --    PHYSICAL EXAM:    CHEST/LUNG: Clear to percussion bilaterally; No rales, rhonchi, wheezing, or rubs  HEART: Regular rate and rhythm; No murmurs, rubs, or gallops  BREASTS: deferred  ABDOMEN: Soft, Nontender, Nondistended; fundus is firm and Bowel sounds present  PERINEUM: Intact  and lochia minimal  SVE: no palpable masses; however exam was suboptimal due to patient discomfort  EXTREMITIES:  2+ Peripheral Pulses, No clubbing, cyanosis, or edema    MEDICATIONS  (STANDING):  diphtheria/tetanus/pertussis (acellular) Vaccine (ADAcel) 0.5 milliLiter(s) IntraMuscular once  oxytocin Infusion 41.667 milliUNIT(s)/Min (125 mL/Hr) IV Continuous <Continuous>  penicillin   G  potassium  IVPB 2.5 Million Unit(s) IV Intermittent every 4 hours  penicillin   G  potassium  IVPB      prenatal multivitamin 1 Tablet(s) Oral daily  sodium chloride 0.9% lock flush 3 milliLiter(s) IV Push every 8 hours    MEDICATIONS  (PRN):  acetaminophen   Tablet 650 milliGRAM(s) Oral every 6 hours PRN For Temp greater than 38.5 C (101.3 F)  acetaminophen   Tablet. 650 milliGRAM(s) Oral every 6 hours PRN Mild Pain  dibucaine 1% Ointment 1 Application(s) Topical every 4 hours PRN Perineal Discomfort  diphenhydrAMINE   Capsule 25 milliGRAM(s) Oral every 6 hours PRN Itching  docusate sodium 100 milliGRAM(s) Oral two times a day PRN Stool Softening  glycerin Suppository - Adult 1 Suppository(s) Rectal at bedtime PRN Constipation  hydrocortisone 1% Cream 1 Application(s) Topical every 4 hours PRN Moderate to Severe Perineal Pain  ibuprofen  Tablet 600 milliGRAM(s) Oral every 6 hours PRN Moderate Pain  lanolin Ointment 1 Application(s) Topical every 6 hours PRN Sore Nipples  magnesium hydroxide Suspension 30 milliLiter(s) Oral two times a day PRN Constipation  oxyCODONE    5 mG/acetaminophen 325 mG 2 Tablet(s) Oral every 6 hours PRN Severe Pain  pramoxine 1%/zinc 5% Cream 1 Application(s) Topical every 4 hours PRN Moderate to Severe Perineal Pain  simethicone 80 milliGRAM(s) Chew every 6 hours PRN Gas  witch hazel Pads 1 Application(s) Topical every 4 hours PRN Perineal Discomfort        LABS:                        11.5   9.3   )-----------( 228      ( 15 Dec 2017 14:14 )             35.6       1. Pain: well controlled on OPM  2. GI: Regular diet  3. : voiding  4. DVT prophylaxis: ambulate  5. Follow AM CBC  6. CT abdomen/pelvis  7.Continue PP care

## 2017-12-17 NOTE — PHYSICAL THERAPY INITIAL EVALUATION ADULT - CRITERIA FOR SKILLED THERAPEUTIC INTERVENTIONS
anticipated equipment needs at discharge/rehab potential/impairments found/functional limitations in following categories/risk reduction/prevention

## 2017-12-18 VITALS
HEART RATE: 84 BPM | TEMPERATURE: 98 F | SYSTOLIC BLOOD PRESSURE: 97 MMHG | RESPIRATION RATE: 18 BRPM | DIASTOLIC BLOOD PRESSURE: 63 MMHG

## 2017-12-18 DIAGNOSIS — Z37.9 OUTCOME OF DELIVERY, UNSPECIFIED: ICD-10-CM

## 2017-12-18 PROCEDURE — 74176 CT ABD & PELVIS W/O CONTRAST: CPT

## 2017-12-18 PROCEDURE — 97116 GAIT TRAINING THERAPY: CPT

## 2017-12-18 PROCEDURE — 85027 COMPLETE CBC AUTOMATED: CPT

## 2017-12-18 PROCEDURE — T1013: CPT

## 2017-12-18 PROCEDURE — 82803 BLOOD GASES ANY COMBINATION: CPT

## 2017-12-18 PROCEDURE — 81001 URINALYSIS AUTO W/SCOPE: CPT

## 2017-12-18 PROCEDURE — 86780 TREPONEMA PALLIDUM: CPT

## 2017-12-18 PROCEDURE — 88307 TISSUE EXAM BY PATHOLOGIST: CPT

## 2017-12-18 PROCEDURE — 97530 THERAPEUTIC ACTIVITIES: CPT

## 2017-12-18 PROCEDURE — 86900 BLOOD TYPING SEROLOGIC ABO: CPT

## 2017-12-18 PROCEDURE — 86901 BLOOD TYPING SEROLOGIC RH(D): CPT

## 2017-12-18 PROCEDURE — 97163 PT EVAL HIGH COMPLEX 45 MIN: CPT

## 2017-12-18 PROCEDURE — 86850 RBC ANTIBODY SCREEN: CPT

## 2017-12-18 PROCEDURE — 36415 COLL VENOUS BLD VENIPUNCTURE: CPT

## 2017-12-18 RX ORDER — DIBUCAINE 1 %
1 OINTMENT (GRAM) RECTAL
Qty: 1 | Refills: 0
Start: 2017-12-18 | End: 2017-12-24

## 2017-12-18 RX ORDER — IBUPROFEN 200 MG
1 TABLET ORAL
Qty: 40 | Refills: 0
Start: 2017-12-18 | End: 2017-12-27

## 2017-12-18 RX ORDER — FERROUS SULFATE 325(65) MG
1 TABLET ORAL
Qty: 28 | Refills: 0
Start: 2017-12-18 | End: 2018-01-14

## 2017-12-18 RX ORDER — DOCUSATE SODIUM 100 MG
1 CAPSULE ORAL
Qty: 14 | Refills: 0
Start: 2017-12-18 | End: 2017-12-24

## 2017-12-18 RX ORDER — DOCUSATE SODIUM 100 MG
1 CAPSULE ORAL
Qty: 0 | Refills: 0 | COMMUNITY
Start: 2017-12-18

## 2017-12-18 RX ORDER — HYDROCORTISONE ACETATE 1 %
1 OINTMENT (GRAM) RECTAL
Qty: 1 | Refills: 0
Start: 2017-12-18 | End: 2017-12-24

## 2017-12-18 RX ADMIN — Medication 1 TABLET(S): at 11:27

## 2017-12-18 RX ADMIN — Medication 600 MILLIGRAM(S): at 14:21

## 2017-12-18 RX ADMIN — Medication 100 MILLIGRAM(S): at 11:27

## 2017-12-18 RX ADMIN — OXYCODONE AND ACETAMINOPHEN 2 TABLET(S): 5; 325 TABLET ORAL at 06:05

## 2017-12-18 RX ADMIN — OXYCODONE AND ACETAMINOPHEN 2 TABLET(S): 5; 325 TABLET ORAL at 06:48

## 2017-12-18 NOTE — CHART NOTE - NSCHARTNOTEFT_GEN_A_CORE
Patient reported by nursing staff to be complaining of severe perineal pain without improvement with NSAIDs or opioids.  Patient found in no acute distress, pleasant, breastfeeding her  infant. Reports that her main complaint is her perineal pain since the delivery, that it has been decreasing but very slowly, with very minimal improvement by Ibuprofen and/or Percocet. Her pain interferes with ambulation, requiring walker to go to the bathroom, she states that she had one bowel movement and that her pain was not aggravated by defecation. I counseled the patient to request the pain medications since they have been ordered PRN. No other complaints at the moment.  Dr. Boyd informed.

## 2017-12-18 NOTE — PROGRESS NOTE ADULT - ATTENDING COMMENTS
PPD#2  Will go home with walker and physical therapy  imaging wnl  d/c home ith pain meds  dispo-home today

## 2017-12-18 NOTE — PROGRESS NOTE ADULT - ASSESSMENT
32y years old.  status post vacuum assisted vaginal delivery at 40 5/7 weeks gestation after induction of labor for PROM Postpartum day # 2 with significant postpartum pain.

## 2017-12-18 NOTE — PROGRESS NOTE ADULT - PROBLEM SELECTOR PLAN 2
Continue Motrin (Ibuprofen) 600 mg every 6 hours and Percocet 5/325mg q6 hours PRN.   Stool softeners.  Follow recommendations by physical therapy.
Pending CT Abd/Pelvis to rule out any pelvic/retroperitoneal hematomas.

## 2017-12-18 NOTE — PROGRESS NOTE ADULT - PROBLEM SELECTOR PLAN 1
Discharge home today.  Pelvic rest and no heavy lifting for 6 weeks.   Follow up in 6 weeks for post partum visit.  Advice to seek medical care in the event of fever, chills, nausea/vomiting, heavy vaginal bleeding, symptoms of depression, or any other concerning symptoms

## 2017-12-18 NOTE — PROGRESS NOTE ADULT - SUBJECTIVE AND OBJECTIVE BOX
Postpartum progress note.  32y years old.  status post vacuum assisted vaginal delivery at 40 5/7 weeks gestation after induction of labor for PROM Postpartum day # 2    Patient seen and examined at bedside, no acute overnight events, she was evaluated by PT yesterday, they recommended therapy 3-5 times/week and ambulation with walker.    Subjective: Patient is ambulating with walker secondary to pelvic pain, tolerating PO intake of regular diet, voiding, passing flatus and having bowel movements. No breast tenderness. Breastfeeding exclusively. Pain is improving. She reports moderate vaginal bleeding, has changed sanitary pads twice during the night, partially soaked. Patient denies headache, nausea/vomiting, shortness of breath, fever, chills or calf pain.     Objective:   Vital Signs: Vital Signs Last 24 Hrs  T(C): 36.5 (17 Dec 2017 21:30), Max: 36.5 (17 Dec 2017 09:02)  T(F): 97.7 (17 Dec 2017 21:30), Max: 97.7 (17 Dec 2017 09:02)  HR: 74 (17 Dec 2017 21:30) (74 - 76)  BP: 107/74 (17 Dec 2017 21:30) (107/74 - 112/72)  BP(mean): --  RR: 18 (17 Dec 2017 21:30) (18 - 18)  SpO2: --    Physical Examination:  General: No acute distress.  Lungs: Clear to auscultation bilaterally, no adventitious sounds.  Cardiovascular: Regular rate and rhythm, normal S1/S2, no murmurs.  Breast: No tenderness  Abdomen: Bowel sounds present, soft, non distended, minimally tender, fundus firm at level of umbilicus.  Pelvic: Minimal lochia rubra.  Extremities: No edema. No calf tenderness, (-) Geoff's sign.    Labs:                        9.0    13.9  )-----------( 192      ( 17 Dec 2017 09:31 )             28.5     < from: CT Abdomen and Pelvis No Cont (17 @ 17:07) >  BOWEL: No bowel obstruction, wall thickening or inflammatory change.   Appendix normal.  PERITONEUM: No ascites , hemorrhage or pneumoperitoneum. There is no   intra-abdominal fluid collection.    REPRODUCTIVE ORGANS: Enlarged uterus compatible with postpartum state. A   small amount of clot is noted in the endometrial and endocervical   cavity's.  BLADDER: Within normal limits.    ABDOMINAL WALL: Within normal limits.  BONES: No acute bony abnormality.    IMPRESSION:   Expected changes in the uterus status post recent vaginal delivery with   small amount of clot in the endometrial and endocervical cavities. No   other acute findings    < end of copied text >      Medication:  MEDICATIONS  (STANDING):  diphtheria/tetanus/pertussis (acellular) Vaccine (ADAcel) 0.5 milliLiter(s) IntraMuscular once  oxytocin Infusion 41.667 milliUNIT(s)/Min (125 mL/Hr) IV Continuous <Continuous>  penicillin   G  potassium  IVPB 2.5 Million Unit(s) IV Intermittent every 4 hours  penicillin   G  potassium  IVPB      prenatal multivitamin 1 Tablet(s) Oral daily  sodium chloride 0.9% lock flush 3 milliLiter(s) IV Push every 8 hours    MEDICATIONS  (PRN):  acetaminophen   Tablet 650 milliGRAM(s) Oral every 6 hours PRN For Temp greater than 38.5 C (101.3 F)  acetaminophen   Tablet. 650 milliGRAM(s) Oral every 6 hours PRN Mild Pain  dibucaine 1% Ointment 1 Application(s) Topical every 4 hours PRN Perineal Discomfort  diphenhydrAMINE   Capsule 25 milliGRAM(s) Oral every 6 hours PRN Itching  docusate sodium 100 milliGRAM(s) Oral two times a day PRN Stool Softening  glycerin Suppository - Adult 1 Suppository(s) Rectal at bedtime PRN Constipation  hydrocortisone 1% Cream 1 Application(s) Topical every 4 hours PRN Moderate to Severe Perineal Pain  ibuprofen  Tablet 600 milliGRAM(s) Oral every 6 hours PRN Moderate Pain  lanolin Ointment 1 Application(s) Topical every 6 hours PRN Sore Nipples  magnesium hydroxide Suspension 30 milliLiter(s) Oral two times a day PRN Constipation  oxyCODONE    5 mG/acetaminophen 325 mG 2 Tablet(s) Oral every 6 hours PRN Severe Pain  pramoxine 1%/zinc 5% Cream 1 Application(s) Topical every 4 hours PRN Moderate to Severe Perineal Pain  simethicone 80 milliGRAM(s) Chew every 6 hours PRN Gas  witch hazel Pads 1 Application(s) Topical every 4 hours PRN Perineal Discomfort

## 2017-12-22 ENCOUNTER — OUTPATIENT (OUTPATIENT)
Dept: OUTPATIENT SERVICES | Facility: HOSPITAL | Age: 32
LOS: 1 days | End: 2017-12-22
Payer: MEDICAID

## 2017-12-22 DIAGNOSIS — R27.9 UNSPECIFIED LACK OF COORDINATION: ICD-10-CM

## 2017-12-22 DIAGNOSIS — Z51.89 ENCOUNTER FOR OTHER SPECIFIED AFTERCARE: ICD-10-CM

## 2017-12-22 PROCEDURE — 97161 PT EVAL LOW COMPLEX 20 MIN: CPT

## 2017-12-26 LAB — SURGICAL PATHOLOGY FINAL REPORT - CH: SIGNIFICANT CHANGE UP

## 2020-03-10 NOTE — PATIENT PROFILE OB - NS PRO TALK SOMEONE YN
no

## 2020-08-10 ENCOUNTER — ASOB RESULT (OUTPATIENT)
Age: 35
End: 2020-08-10

## 2020-08-10 ENCOUNTER — APPOINTMENT (OUTPATIENT)
Dept: ANTEPARTUM | Facility: CLINIC | Age: 35
End: 2020-08-10
Payer: MEDICAID

## 2020-08-10 PROCEDURE — 76811 OB US DETAILED SNGL FETUS: CPT | Mod: 59

## 2020-11-02 ENCOUNTER — ASOB RESULT (OUTPATIENT)
Age: 35
End: 2020-11-02

## 2020-11-02 ENCOUNTER — APPOINTMENT (OUTPATIENT)
Dept: ANTEPARTUM | Facility: CLINIC | Age: 35
End: 2020-11-02
Payer: MEDICAID

## 2020-11-02 PROCEDURE — 99072 ADDL SUPL MATRL&STAF TM PHE: CPT

## 2020-11-02 PROCEDURE — 76816 OB US FOLLOW-UP PER FETUS: CPT

## 2020-11-30 ENCOUNTER — ASOB RESULT (OUTPATIENT)
Age: 35
End: 2020-11-30

## 2020-11-30 ENCOUNTER — APPOINTMENT (OUTPATIENT)
Dept: ANTEPARTUM | Facility: CLINIC | Age: 35
End: 2020-11-30
Payer: MEDICAID

## 2020-11-30 PROCEDURE — 76816 OB US FOLLOW-UP PER FETUS: CPT

## 2020-11-30 PROCEDURE — 76819 FETAL BIOPHYS PROFIL W/O NST: CPT

## 2020-12-28 ENCOUNTER — ASOB RESULT (OUTPATIENT)
Age: 35
End: 2020-12-28

## 2020-12-28 ENCOUNTER — APPOINTMENT (OUTPATIENT)
Dept: ANTEPARTUM | Facility: CLINIC | Age: 35
End: 2020-12-28
Payer: MEDICAID

## 2020-12-28 PROCEDURE — 76819 FETAL BIOPHYS PROFIL W/O NST: CPT

## 2020-12-28 PROCEDURE — 76816 OB US FOLLOW-UP PER FETUS: CPT

## 2020-12-28 PROCEDURE — 99072 ADDL SUPL MATRL&STAF TM PHE: CPT

## 2020-12-30 ENCOUNTER — TRANSCRIPTION ENCOUNTER (OUTPATIENT)
Age: 35
End: 2020-12-30

## 2020-12-31 ENCOUNTER — INPATIENT (INPATIENT)
Facility: HOSPITAL | Age: 35
LOS: 1 days | Discharge: HOME | End: 2021-01-02
Attending: OBSTETRICS & GYNECOLOGY | Admitting: OBSTETRICS & GYNECOLOGY
Payer: MEDICAID

## 2020-12-31 ENCOUNTER — TRANSCRIPTION ENCOUNTER (OUTPATIENT)
Age: 35
End: 2020-12-31

## 2020-12-31 VITALS
DIASTOLIC BLOOD PRESSURE: 77 MMHG | HEIGHT: 64 IN | SYSTOLIC BLOOD PRESSURE: 127 MMHG | HEART RATE: 75 BPM | TEMPERATURE: 98 F | WEIGHT: 184.97 LBS | RESPIRATION RATE: 15 BRPM

## 2020-12-31 DIAGNOSIS — O47.1 FALSE LABOR AT OR AFTER 37 COMPLETED WEEKS OF GESTATION: ICD-10-CM

## 2020-12-31 LAB
BASOPHILS # BLD AUTO: 0.02 K/UL — SIGNIFICANT CHANGE UP (ref 0–0.2)
BASOPHILS # BLD AUTO: 0.05 K/UL — SIGNIFICANT CHANGE UP (ref 0–0.2)
BASOPHILS NFR BLD AUTO: 0.2 % — SIGNIFICANT CHANGE UP (ref 0–2)
BASOPHILS NFR BLD AUTO: 0.4 % — SIGNIFICANT CHANGE UP (ref 0–2)
BLD GP AB SCN SERPL QL: SIGNIFICANT CHANGE UP
EOSINOPHIL # BLD AUTO: 0.09 K/UL — SIGNIFICANT CHANGE UP (ref 0–0.5)
EOSINOPHIL # BLD AUTO: 0.16 K/UL — SIGNIFICANT CHANGE UP (ref 0–0.5)
EOSINOPHIL NFR BLD AUTO: 0.6 % — SIGNIFICANT CHANGE UP (ref 0–6)
EOSINOPHIL NFR BLD AUTO: 1.8 % — SIGNIFICANT CHANGE UP (ref 0–6)
HCT VFR BLD CALC: 35.9 % — SIGNIFICANT CHANGE UP (ref 34.5–45)
HCT VFR BLD CALC: 38.8 % — SIGNIFICANT CHANGE UP (ref 34.5–45)
HGB BLD-MCNC: 11.4 G/DL — LOW (ref 11.5–15.5)
HGB BLD-MCNC: 12.2 G/DL — SIGNIFICANT CHANGE UP (ref 11.5–15.5)
HIV 1 & 2 AB SERPL IA.RAPID: SIGNIFICANT CHANGE UP
IMM GRANULOCYTES NFR BLD AUTO: 1 % — SIGNIFICANT CHANGE UP (ref 0–1.5)
IMM GRANULOCYTES NFR BLD AUTO: 1 % — SIGNIFICANT CHANGE UP (ref 0–1.5)
LYMPHOCYTES # BLD AUTO: 1.3 K/UL — SIGNIFICANT CHANGE UP (ref 1–3.3)
LYMPHOCYTES # BLD AUTO: 1.6 K/UL — SIGNIFICANT CHANGE UP (ref 1–3.3)
LYMPHOCYTES # BLD AUTO: 17.9 % — SIGNIFICANT CHANGE UP (ref 13–44)
LYMPHOCYTES # BLD AUTO: 9.2 % — LOW (ref 13–44)
MCHC RBC-ENTMCNC: 27.5 PG — SIGNIFICANT CHANGE UP (ref 27–34)
MCHC RBC-ENTMCNC: 27.7 PG — SIGNIFICANT CHANGE UP (ref 27–34)
MCHC RBC-ENTMCNC: 31.4 GM/DL — LOW (ref 32–36)
MCHC RBC-ENTMCNC: 31.8 GM/DL — LOW (ref 32–36)
MCV RBC AUTO: 86.7 FL — SIGNIFICANT CHANGE UP (ref 80–100)
MCV RBC AUTO: 88.2 FL — SIGNIFICANT CHANGE UP (ref 80–100)
MONOCYTES # BLD AUTO: 0.21 K/UL — SIGNIFICANT CHANGE UP (ref 0–0.9)
MONOCYTES # BLD AUTO: 0.46 K/UL — SIGNIFICANT CHANGE UP (ref 0–0.9)
MONOCYTES NFR BLD AUTO: 1.5 % — LOW (ref 2–14)
MONOCYTES NFR BLD AUTO: 5.1 % — SIGNIFICANT CHANGE UP (ref 2–14)
NEUTROPHILS # BLD AUTO: 12.41 K/UL — HIGH (ref 1.8–7.4)
NEUTROPHILS # BLD AUTO: 6.62 K/UL — SIGNIFICANT CHANGE UP (ref 1.8–7.4)
NEUTROPHILS NFR BLD AUTO: 74 % — SIGNIFICANT CHANGE UP (ref 43–77)
NEUTROPHILS NFR BLD AUTO: 87.3 % — HIGH (ref 43–77)
PLATELET # BLD AUTO: 197 K/UL — SIGNIFICANT CHANGE UP (ref 150–400)
PLATELET # BLD AUTO: 201 K/UL — SIGNIFICANT CHANGE UP (ref 150–400)
RBC # BLD: 4.14 M/UL — SIGNIFICANT CHANGE UP (ref 3.8–5.2)
RBC # BLD: 4.4 M/UL — SIGNIFICANT CHANGE UP (ref 3.8–5.2)
RBC # FLD: 15.4 % — HIGH (ref 10.3–14.5)
RBC # FLD: 15.4 % — HIGH (ref 10.3–14.5)
SARS-COV-2 RNA SPEC QL NAA+PROBE: SIGNIFICANT CHANGE UP
WBC # BLD: 14.2 K/UL — HIGH (ref 3.8–10.5)
WBC # BLD: 8.95 K/UL — SIGNIFICANT CHANGE UP (ref 3.8–10.5)
WBC # FLD AUTO: 14.2 K/UL — HIGH (ref 3.8–10.5)
WBC # FLD AUTO: 8.95 K/UL — SIGNIFICANT CHANGE UP (ref 3.8–10.5)

## 2020-12-31 RX ORDER — SODIUM CHLORIDE 9 MG/ML
1000 INJECTION, SOLUTION INTRAVENOUS
Refills: 0 | Status: DISCONTINUED | OUTPATIENT
Start: 2020-12-31 | End: 2020-12-31

## 2020-12-31 RX ORDER — OXYCODONE HYDROCHLORIDE 5 MG/1
5 TABLET ORAL
Refills: 0 | Status: DISCONTINUED | OUTPATIENT
Start: 2020-12-31 | End: 2021-01-02

## 2020-12-31 RX ORDER — SODIUM CHLORIDE 9 MG/ML
1000 INJECTION, SOLUTION INTRAVENOUS
Refills: 0 | Status: DISCONTINUED | OUTPATIENT
Start: 2020-12-31 | End: 2021-01-02

## 2020-12-31 RX ORDER — METOCLOPRAMIDE HCL 10 MG
10 TABLET ORAL ONCE
Refills: 0 | Status: DISCONTINUED | OUTPATIENT
Start: 2020-12-31 | End: 2020-12-31

## 2020-12-31 RX ORDER — OXYCODONE HYDROCHLORIDE 5 MG/1
5 TABLET ORAL ONCE
Refills: 0 | Status: DISCONTINUED | OUTPATIENT
Start: 2020-12-31 | End: 2021-01-02

## 2020-12-31 RX ORDER — DIPHENHYDRAMINE HCL 50 MG
25 CAPSULE ORAL EVERY 6 HOURS
Refills: 0 | Status: DISCONTINUED | OUTPATIENT
Start: 2020-12-31 | End: 2021-01-02

## 2020-12-31 RX ORDER — OXYTOCIN 10 UNIT/ML
333.33 VIAL (ML) INJECTION
Qty: 20 | Refills: 0 | Status: DISCONTINUED | OUTPATIENT
Start: 2020-12-31 | End: 2021-01-02

## 2020-12-31 RX ORDER — CEFAZOLIN SODIUM 1 G
2000 VIAL (EA) INJECTION ONCE
Refills: 0 | Status: COMPLETED | OUTPATIENT
Start: 2020-12-31 | End: 2020-12-31

## 2020-12-31 RX ORDER — IBUPROFEN 200 MG
600 TABLET ORAL EVERY 6 HOURS
Refills: 0 | Status: COMPLETED | OUTPATIENT
Start: 2020-12-31 | End: 2021-11-29

## 2020-12-31 RX ORDER — OXYTOCIN 10 UNIT/ML
333.33 VIAL (ML) INJECTION
Qty: 20 | Refills: 0 | Status: COMPLETED | OUTPATIENT
Start: 2020-12-31 | End: 2020-12-31

## 2020-12-31 RX ORDER — FAMOTIDINE 10 MG/ML
20 INJECTION INTRAVENOUS ONCE
Refills: 0 | Status: COMPLETED | OUTPATIENT
Start: 2020-12-31 | End: 2020-12-31

## 2020-12-31 RX ORDER — SODIUM CHLORIDE 9 MG/ML
1000 INJECTION, SOLUTION INTRAVENOUS ONCE
Refills: 0 | Status: COMPLETED | OUTPATIENT
Start: 2020-12-31 | End: 2020-12-31

## 2020-12-31 RX ORDER — SIMETHICONE 80 MG/1
80 TABLET, CHEWABLE ORAL EVERY 4 HOURS
Refills: 0 | Status: DISCONTINUED | OUTPATIENT
Start: 2020-12-31 | End: 2021-01-02

## 2020-12-31 RX ORDER — LANOLIN
1 OINTMENT (GRAM) TOPICAL EVERY 6 HOURS
Refills: 0 | Status: DISCONTINUED | OUTPATIENT
Start: 2020-12-31 | End: 2021-01-02

## 2020-12-31 RX ORDER — KETOROLAC TROMETHAMINE 30 MG/ML
30 SYRINGE (ML) INJECTION EVERY 6 HOURS
Refills: 0 | Status: DISCONTINUED | OUTPATIENT
Start: 2020-12-31 | End: 2021-01-01

## 2020-12-31 RX ORDER — ENOXAPARIN SODIUM 100 MG/ML
40 INJECTION SUBCUTANEOUS EVERY 24 HOURS
Refills: 0 | Status: DISCONTINUED | OUTPATIENT
Start: 2021-01-01 | End: 2021-01-02

## 2020-12-31 RX ORDER — MAGNESIUM HYDROXIDE 400 MG/1
30 TABLET, CHEWABLE ORAL
Refills: 0 | Status: DISCONTINUED | OUTPATIENT
Start: 2020-12-31 | End: 2021-01-02

## 2020-12-31 RX ORDER — CITRIC ACID/SODIUM CITRATE 300-500 MG
30 SOLUTION, ORAL ORAL ONCE
Refills: 0 | Status: COMPLETED | OUTPATIENT
Start: 2020-12-31 | End: 2020-12-31

## 2020-12-31 RX ORDER — ACETAMINOPHEN 500 MG
975 TABLET ORAL
Refills: 0 | Status: DISCONTINUED | OUTPATIENT
Start: 2020-12-31 | End: 2021-01-02

## 2020-12-31 RX ORDER — TETANUS TOXOID, REDUCED DIPHTHERIA TOXOID AND ACELLULAR PERTUSSIS VACCINE, ADSORBED 5; 2.5; 8; 8; 2.5 [IU]/.5ML; [IU]/.5ML; UG/.5ML; UG/.5ML; UG/.5ML
0.5 SUSPENSION INTRAMUSCULAR ONCE
Refills: 0 | Status: DISCONTINUED | OUTPATIENT
Start: 2020-12-31 | End: 2021-01-02

## 2020-12-31 RX ORDER — OXYCODONE HYDROCHLORIDE 5 MG/1
5 TABLET ORAL
Refills: 0 | Status: COMPLETED | OUTPATIENT
Start: 2020-12-31 | End: 2021-01-07

## 2020-12-31 RX ADMIN — FAMOTIDINE 20 MILLIGRAM(S): 10 INJECTION INTRAVENOUS at 16:38

## 2020-12-31 RX ADMIN — Medication 1000 MILLIUNIT(S)/MIN: at 17:35

## 2020-12-31 RX ADMIN — Medication 100 MILLIGRAM(S): at 17:16

## 2020-12-31 RX ADMIN — Medication 1000 MILLIUNIT(S)/MIN: at 19:44

## 2020-12-31 RX ADMIN — SODIUM CHLORIDE 2000 MILLILITER(S): 9 INJECTION, SOLUTION INTRAVENOUS at 15:00

## 2020-12-31 RX ADMIN — Medication 30 MILLILITER(S): at 16:38

## 2020-12-31 NOTE — OB PROVIDER H&P - ASSESSMENT
The pt is a 34 y/o  at 40w who presents from ob/gyn office with decreased fetal movement and non reactive nst.   - Admit to L&D for elective pCS in the setting of h/o shoulder dystocia and current concern for LGA  - Consent  - Admission Labs  - IV fluids   - Continuous toco and fetal monitoring   - pre-op abx     Discussed with Dr. Grove

## 2020-12-31 NOTE — OB PROVIDER H&P - PMH
Medication/Refill approved per CPA:    [facility] COLLABORATIVE AGREEMENT FOR  IMMUNOSUPPRESSENT PRESCRIPTION MODIFICATION.      Routing encounter to Transplant as an FYI.    Thanks,  Ksenia White, PharmD  Specialty Pharmacist/Transplant  [facility]  [phone]            
Shoulder dystocia, delivered  2017  Vaginal delivery  2007 2017

## 2020-12-31 NOTE — OB PROVIDER DELIVERY SUMMARY - NSPROVIDERDELIVERYNOTE_OBGYN_ALL_OB_FT
Primary  for suspected macrosomia  consented  ancef  pfanensteil  normal anatomy  low transverse uterine incision  atraumatic delivery of fetus and placenta  nuchal x 1  4450g, male,  at 1734hrs  1 layer uterine closure   abdomen and skin closed  #[id number]

## 2020-12-31 NOTE — OB RN PREOPERATIVE CHECKLIST - IV STARTED
yes Airway patent, Nasal mucosa clear. Mouth with normal mucosa. Throat has no vesicles, no oropharyngeal exudates and uvula is midline.

## 2020-12-31 NOTE — DISCHARGE NOTE OB - MEDICATION SUMMARY - MEDICATIONS TO TAKE
I will START or STAY ON the medications listed below when I get home from the hospital:    acetaminophen 325 mg oral tablet  -- 3 tab(s) by mouth every 6 hours, As Needed -for moderate pain MDD:4,000 mg   -- Indication: For moderate pain    ibuprofen 600 mg oral tablet  -- 1 tab(s) by mouth every 6 hours, As Needed -for moderate pain  -   -- Indication: For moderate pain    ferrous sulfate 325 mg (65 mg elemental iron) oral tablet  -- 1 tab(s) by mouth once a day   -- Check with your doctor before becoming pregnant.  Do not chew, break, or crush.  May discolor urine or feces.    -- Indication: For anemia    Prenatal Multivitamins with Folic Acid 1 mg oral tablet  -- 1 tab(s) by mouth once a day  -- Indication: For postpartum recovery

## 2020-12-31 NOTE — OB PROVIDER H&P - HISTORY OF PRESENT ILLNESS
The pt is a 36 y/o  at 40w who presents from ob/gyn office with decreased fetal movement and non reactive nst. She denies any contractions, LOF, or vaginal bleeding. H/o shoulder dystocia in last pregnancy, concerned for LGA infant in this pregnancy, measuring in >80th percentile.     EDC: 2020  POBHx: FT- x2 2007 7lbs and 2017 (shoulder dystocia) 9lbs  PMHx: Anemia  PSHx: none  Meds: PNV, Iron  Allergies: NKDA  Social: denies toxic habits x3 The pt is a 34 y/o  at 40w who presents from ob/gyn office with decreased fetal movement and non reactive nst. She denies any contractions, LOF, or vaginal bleeding. H/o shoulder dystocia in last pregnancy, concerned for LGA infant in this pregnancy, measuring in >80th percentile.     EDC: 2020  POBHx: FT- x2 2007 7lbs and 2017 (shoulder dystocia) 9lbs  PMHx: Anemia  PSHx: none  Meds: PNV, Iron  Allergies: NKDA  Social: denies toxic habits x3    Hb: NR  Abo: O+  HIV: NR  RPR: NR  Rub: Immune

## 2020-12-31 NOTE — OB PROVIDER H&P - ATTENDING COMMENTS
34 y/o  at 40w   efw 96th %tile  hx of labor dystocia in prior pregnancy   concerned about risk of shoulder dystocia  desires elective primary   tracing reactive  informed consent obtained

## 2020-12-31 NOTE — DISCHARGE NOTE OB - PLAN OF CARE
Rapid recovery Please call your provider in 1 week for wound check. Take medications as directed, regular diet, activity as tolerated. Exclusive breast feeding for the first 6 months is recommended. Nothing per vagina for 6 weeks (incl. sex, douching, etc). If you have additional concerns, please inform your provider. Continue taking iron supplement daily, along with orange juice or vitamin C.

## 2020-12-31 NOTE — OB RN DELIVERY SUMMARY - NS_SEPSISRSKCALC_OBGYN_ALL_OB_FT
No temperature has been documented for this patient in CPN or on the OB Flowsheet. Ensure the highest temperature during labor was documented on the OB Flowsheet.  No gestational age at birth has been documented. Ensure delivery date/time has been entered above.  Rupture of membranes must be entered above.   EOS calculated successfully. EOS Risk Factor: 0.5/1000 live births ([facility] national incidence); GA=40w;Temp=98.4; ROM=0.017; GBS='Unknown'; Antibiotics='No antibiotics or any antibiotics < 2 hrs prior to birth'

## 2020-12-31 NOTE — DISCHARGE NOTE OB - PROVIDER TOKENS
FREE:[LAST:[[facility]],PHONE:[phone]],FAX:[phone]],ADDRESS:[[address] [place] [address]]]

## 2020-12-31 NOTE — DISCHARGE NOTE OB - HOSPITAL COURSE
She is now a  who presented for a scheduled primary  section for suspected LGA. She had an uncomplicated surgery and postpartum course. Upon discharge she was passing gas, tolerating PO, and ambulating.

## 2020-12-31 NOTE — OB RN PATIENT PROFILE - EDUCATION OF PARENTS ON THE BENEFITS OF SKIN TO SKIN AND BREASTFEEDING TO BOTH MOTHER AND INFANT.
02/14/2019      Vicki Peña  [address] [place] [address]          Hospital Medicine Dept.  [facility] [address] [place] [address]  [phone] after hours  [phone] fax Vicki Peña has been hospitalized at the [facility] since 2/2/2019.  The patient's daughters have been at her bedside providing much needed support. Please make appropriate accommodations for the patient and her daughters.     Please contact me at [phone] if you have any questions.                  __________________________  Marcelle Jones MD  02/14/2019            Statement Selected

## 2020-12-31 NOTE — OB NEONATOLOGY/PEDIATRICIAN DELIVERY SUMMARY - NSPEDSNEONOTESA_OBGYN_ALL_OB_FT
Called by Dr. Grove to attend this elective primary C/S of a 36yo  mother at 40 weeks due to suspected fetal macrosomia and maternal history of prior  complicated by macrosomia/shoulder dystocia. Mother had GDM with last pregnancy but no GDM this pregnancy. Prenatal labs include blood type O+, GBS unknown (no labor, ROM at del), Covid unknown/pending, otherwise all labs negative including HIV negative, RPR NR, RI, Hep B negative.   ROM was at delivery. Resuscitation included: routine w/d/s only. Apgars were: 8/9. Physical exam grossly wnl. Stable for admission to [facility]. Called by Dr. Grove to attend this elective primary C/S of a 36yo  mother at 40 weeks due to suspected fetal macrosomia and maternal history of prior  complicated by macrosomia/shoulder dystocia. Mother had GDM with last pregnancy but no GDM this pregnancy. Prenatal labs include blood type O+, GBS unknown (no labor, ROM at del), Covid unknown/pending, otherwise all labs negative including HIV negative, RPR NR, RI, Hep B negative.  ROM was at delivery. Baby born vigorous with spontaneous cry. Resuscitation included: routine w/d/s only. Apgars were: 9/9. Physical exam grossly wnl. LGA - BW 4450. Stable for admission to N. Ensure infant feeds in 1st hour of life, then q2-3 hours. Glucose monitoring per LGA protocol.

## 2020-12-31 NOTE — DISCHARGE NOTE OB - PATIENT PORTAL LINK FT
You can access the FollowMyHealth Patient Portal offered by [facility] by registering at the following website: [email or web address]. By joining [employer]’s FollowMyHealth portal, you will also be able to view your health information using other applications (apps) compatible with our system.

## 2020-12-31 NOTE — DISCHARGE NOTE OB - CARE PLAN
Principal Discharge DX:	 delivery delivered  Goal:	Rapid recovery  Assessment and plan of treatment:	Please call your provider in 1 week for wound check. Take medications as directed, regular diet, activity as tolerated. Exclusive breast feeding for the first 6 months is recommended. Nothing per vagina for 6 weeks (incl. sex, douching, etc). If you have additional concerns, please inform your provider.  Secondary Diagnosis:	Anemia due to acute blood loss  Goal:	Rapid recovery  Assessment and plan of treatment:	Continue taking iron supplement daily, along with orange juice or vitamin C.

## 2020-12-31 NOTE — DISCHARGE NOTE OB - CARE PROVIDER_API CALL
[facility],   [address] [place] [address]  Phone: [phone]  Fax: [phone]  Follow Up Time:

## 2020-12-31 NOTE — OB PROVIDER H&P - NSHPPHYSICALEXAM_GEN_ALL_CORE
Vital Signs Last 24 Hrs  T(C): 36.9 (31 Dec 2020 14:26), Max: 36.9 (31 Dec 2020 14:26)  T(F): 98.4 (31 Dec 2020 14:26), Max: 98.4 (31 Dec 2020 14:26)  HR: 75 (31 Dec 2020 14:30) (75 - 75)  BP: 127/77 (31 Dec 2020 14:30) (127/77 - 127/77)  RR: 15 (31 Dec 2020 14:26) (15 - 15)    FHT: 140 baseline, minimal variability, + accels, no decels  [place]: uterine irritability     PE  General: NAD  Heart: RRR  Lungs: CTAB  Abdomen: gravid     Bedside sono: Vital Signs Last 24 Hrs  T(C): 36.9 (31 Dec 2020 14:26), Max: 36.9 (31 Dec 2020 14:26)  T(F): 98.4 (31 Dec 2020 14:26), Max: 98.4 (31 Dec 2020 14:26)  HR: 75 (31 Dec 2020 14:30) (75 - 75)  BP: 127/77 (31 Dec 2020 14:30) (127/77 - 127/77)  RR: 15 (31 Dec 2020 14:26) (15 - 15)    FHT: 140 baseline, minimal variability, + accels, no decels  [place]: uterine irritability     PE  General: NAD  Heart: RRR  Lungs: CTAB  Abdomen: gravid     Bedside sono: vertex, posterior placenta

## 2021-01-01 LAB
BASOPHILS # BLD AUTO: 0.05 K/UL — SIGNIFICANT CHANGE UP (ref 0–0.2)
BASOPHILS NFR BLD AUTO: 0.3 % — SIGNIFICANT CHANGE UP (ref 0–2)
EOSINOPHIL # BLD AUTO: 0.05 K/UL — SIGNIFICANT CHANGE UP (ref 0–0.5)
EOSINOPHIL NFR BLD AUTO: 0.3 % — SIGNIFICANT CHANGE UP (ref 0–6)
HCT VFR BLD CALC: 30.1 % — LOW (ref 34.5–45)
HGB BLD-MCNC: 9.5 G/DL — LOW (ref 11.5–15.5)
IMM GRANULOCYTES NFR BLD AUTO: 0.9 % — SIGNIFICANT CHANGE UP (ref 0–1.5)
LYMPHOCYTES # BLD AUTO: 1.91 K/UL — SIGNIFICANT CHANGE UP (ref 1–3.3)
LYMPHOCYTES # BLD AUTO: 10 % — LOW (ref 13–44)
MCHC RBC-ENTMCNC: 27.7 PG — SIGNIFICANT CHANGE UP (ref 27–34)
MCHC RBC-ENTMCNC: 31.6 GM/DL — LOW (ref 32–36)
MCV RBC AUTO: 87.8 FL — SIGNIFICANT CHANGE UP (ref 80–100)
MONOCYTES # BLD AUTO: 1.34 K/UL — HIGH (ref 0–0.9)
MONOCYTES NFR BLD AUTO: 7 % — SIGNIFICANT CHANGE UP (ref 2–14)
NEUTROPHILS # BLD AUTO: 15.65 K/UL — HIGH (ref 1.8–7.4)
NEUTROPHILS NFR BLD AUTO: 81.5 % — HIGH (ref 43–77)
PLATELET # BLD AUTO: 191 K/UL — SIGNIFICANT CHANGE UP (ref 150–400)
RBC # BLD: 3.43 M/UL — LOW (ref 3.8–5.2)
RBC # FLD: 15.1 % — HIGH (ref 10.3–14.5)
SARS-COV-2 IGG SERPL QL IA: NEGATIVE — SIGNIFICANT CHANGE UP
SARS-COV-2 IGM SERPL IA-ACNC: 0.06 INDEX — SIGNIFICANT CHANGE UP
T PALLIDUM AB TITR SER: NEGATIVE — SIGNIFICANT CHANGE UP
WBC # BLD: 19.17 K/UL — HIGH (ref 3.8–10.5)
WBC # FLD AUTO: 19.17 K/UL — HIGH (ref 3.8–10.5)

## 2021-01-01 PROCEDURE — 93970 EXTREMITY STUDY: CPT | Mod: 26

## 2021-01-01 RX ORDER — ACETAMINOPHEN 500 MG
3 TABLET ORAL
Qty: 120 | Refills: 0
Start: 2021-01-01 | End: 2021-01-10

## 2021-01-01 RX ORDER — IBUPROFEN 200 MG
1 TABLET ORAL
Qty: 40 | Refills: 0
Start: 2021-01-01 | End: 2021-01-10

## 2021-01-01 RX ORDER — IBUPROFEN 200 MG
600 TABLET ORAL EVERY 6 HOURS
Refills: 0 | Status: DISCONTINUED | OUTPATIENT
Start: 2021-01-01 | End: 2021-01-02

## 2021-01-01 RX ORDER — FERROUS SULFATE 325(65) MG
1 TABLET ORAL
Qty: 30 | Refills: 0
Start: 2021-01-01 | End: 2021-01-30

## 2021-01-01 RX ADMIN — Medication 600 MILLIGRAM(S): at 18:25

## 2021-01-01 RX ADMIN — Medication 600 MILLIGRAM(S): at 13:48

## 2021-01-01 RX ADMIN — ENOXAPARIN SODIUM 40 MILLIGRAM(S): 100 INJECTION SUBCUTANEOUS at 05:52

## 2021-01-01 RX ADMIN — Medication 600 MILLIGRAM(S): at 23:49

## 2021-01-01 RX ADMIN — Medication 600 MILLIGRAM(S): at 13:18

## 2021-01-01 RX ADMIN — Medication 30 MILLIGRAM(S): at 00:45

## 2021-01-01 RX ADMIN — Medication 30 MILLIGRAM(S): at 00:30

## 2021-01-01 RX ADMIN — Medication 600 MILLIGRAM(S): at 17:55

## 2021-01-02 VITALS
OXYGEN SATURATION: 97 % | TEMPERATURE: 98 F | HEART RATE: 77 BPM | RESPIRATION RATE: 17 BRPM | SYSTOLIC BLOOD PRESSURE: 104 MMHG | DIASTOLIC BLOOD PRESSURE: 65 MMHG

## 2021-01-02 DIAGNOSIS — D62 ACUTE POSTHEMORRHAGIC ANEMIA: ICD-10-CM

## 2021-01-02 PROCEDURE — 93970 EXTREMITY STUDY: CPT

## 2021-01-02 PROCEDURE — 85025 COMPLETE CBC W/AUTO DIFF WBC: CPT

## 2021-01-02 PROCEDURE — 36415 COLL VENOUS BLD VENIPUNCTURE: CPT

## 2021-01-02 PROCEDURE — U0003: CPT

## 2021-01-02 PROCEDURE — T1013: CPT

## 2021-01-02 PROCEDURE — 86901 BLOOD TYPING SEROLOGIC RH(D): CPT

## 2021-01-02 PROCEDURE — 87389 HIV-1 AG W/HIV-1&-2 AB AG IA: CPT

## 2021-01-02 PROCEDURE — 86769 SARS-COV-2 COVID-19 ANTIBODY: CPT

## 2021-01-02 PROCEDURE — 86900 BLOOD TYPING SEROLOGIC ABO: CPT

## 2021-01-02 PROCEDURE — 86780 TREPONEMA PALLIDUM: CPT

## 2021-01-02 PROCEDURE — 86703 HIV-1/HIV-2 1 RESULT ANTBDY: CPT

## 2021-01-02 PROCEDURE — 86850 RBC ANTIBODY SCREEN: CPT

## 2021-01-02 RX ADMIN — Medication 600 MILLIGRAM(S): at 12:36

## 2021-01-02 RX ADMIN — ENOXAPARIN SODIUM 40 MILLIGRAM(S): 100 INJECTION SUBCUTANEOUS at 05:45

## 2021-01-02 RX ADMIN — Medication 600 MILLIGRAM(S): at 12:06

## 2021-01-02 RX ADMIN — Medication 600 MILLIGRAM(S): at 00:19

## 2021-01-02 NOTE — PROGRESS NOTE ADULT - ASSESSMENT
A/P:  34yo  now POD#2 s/p primary  section 2/2 to suspected LGA and history of shoulder dystocia at 40 weeks gestation. She had a hemoglobin drop of 12.2 to 9.5 on POD#1.   -Vital Signs Stable  -Voiding, tolerating PO, bowel function nml   -Advance care as tolerated   -Continue routine postpartum/postoperative care and education.  -Ambulation, DVT prophylaxis lovenox  -Healthy male infant, received circumcision.  -Patient desires to go home today    Anemia  -pt asymptomatic, not requiring iron at this time
A/P:  34yo  now POD#1 s/p primary  section 2/2 to suspected LGA w/ history of shoulder dystocia at 40 weeks gestation.  -Vital Signs Stable  -Voiding, tolerating PO, bowel function nml   -Advance care as tolerated   -Continue routine postpartum/postoperative care and education.  -Ambulation, DVT prophylaxis lovenox  -Healthy male infant, declines circumcision.    Leg pain  - likely to be musculoskeletal cramps, will obtain dopplers

## 2021-01-02 NOTE — PROGRESS NOTE ADULT - SUBJECTIVE AND OBJECTIVE BOX
CRICKET PEREA is a 36yo  now POD#2 s/p primary  section 2/ to suspected LGA and history of shoulder dystocia at 40 weeks gestation. She had a hemoglobin drop of 12.2 to 9.5 on POD#1.     S:    The patient has no complaints. Pain controlled with medication   She is ambulating without difficulty and tolerating PO.   + flatus/-BM/+ voiding   Patient denies headache, chest pain, SOB, and leg pain.     O:    T(C): 36.7 (21 @ 04:55), Max: 36.8 (21 @ 09:00)  HR: 75 (21 @ 04:55) (72 - 85)  BP: 101/66 (21 @ 04:55) (89/53 - 106/66)  RR: 16 (21 @ 04:55) (16 - 16)  SpO2: 98% (21 @ 04:55) (97% - 99%)      Gen: NAD, AOx3  CV: RRR  Pulm: CTAB  Breast: nontender, not engorged   Abdomen:  soft, non-tender, non-distended, +bowel sounds.  Uterus:  Fundus firm below umbilicus  Incision:  Clean/dry/intact with steri-strips in place  VE:  +lochia  Ext:  Non-tender, no edema                           9.5    19.17 )-----------( 191      ( 2021 06:37 )             30.1               
CRICKET PEREA is a 34yo  now POD#1 s/p primary  section 2/2 to suspected LGA w/ history of shoulder dystocia at 40 weeks gestation.    S:    The patient reports leg cramps. Pain controlled with medication   She is ambulating without difficulty and tolerating PO.   + flatus/-BM/+ voiding   Patient denies headache, chest pain, SOB, and leg pain.     O:    T(C): 36.7 (21 @ 04:11), Max: 37.1 (20 @ 20:58)  HR: 73 (21 @ 04:11) (70 - 84)  BP: 95/64 (21 @ 04:11) (76/65 - 127/77)  RR: 16 (21 @ 04:11) (9 - 23)  SpO2: 97% (21 @ 04:11) (97% - 100%)      Gen: NAD, AOx3  CV: RRR  Pulm: CTAB  Breast: nontender, not engorged   Abdomen:  soft, non-tender, non-distended, +bowel sounds.  Uterus:  Fundus firm below umbilicus  Incision:  Clean/dry/intact with steri-strips in place  VE:  +lochia  Ext:  Non-tender, no edema                           12.2   14.20 )-----------( 201      ( 31 Dec 2020 19:31 )             38.8

## 2021-01-02 NOTE — PROGRESS NOTE ADULT - ATTENDING COMMENTS
patient is POD#2 status post primary  c/.section  patient passed gas and incision looks well  breast not engorged  patient stable for discharge home

## 2022-09-26 NOTE — DISCHARGE NOTE OB - CRACKED, BLEEDING NIPPLES
Patient is calling in stating she is having pain coming from LT knee and LT hip. She is wanting to know if she should come in and be seen, I am looking to see if I can find her an appointment with the PA    Statement Selected